# Patient Record
Sex: FEMALE | Race: WHITE | NOT HISPANIC OR LATINO | Employment: STUDENT | URBAN - METROPOLITAN AREA
[De-identification: names, ages, dates, MRNs, and addresses within clinical notes are randomized per-mention and may not be internally consistent; named-entity substitution may affect disease eponyms.]

---
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
## 2022-09-12 ENCOUNTER — OFFICE VISIT (OUTPATIENT)
Dept: FAMILY MEDICINE CLINIC | Facility: CLINIC | Age: 22
End: 2022-09-12

## 2022-09-12 VITALS
HEIGHT: 66 IN | RESPIRATION RATE: 16 BRPM | DIASTOLIC BLOOD PRESSURE: 67 MMHG | SYSTOLIC BLOOD PRESSURE: 102 MMHG | HEART RATE: 79 BPM | TEMPERATURE: 98.1 F | WEIGHT: 127.6 LBS | BODY MASS INDEX: 20.51 KG/M2

## 2022-09-12 DIAGNOSIS — Z00.00 ANNUAL PHYSICAL EXAM: Primary | ICD-10-CM

## 2022-09-12 PROCEDURE — 99395 PREV VISIT EST AGE 18-39: CPT | Performed by: FAMILY MEDICINE

## 2022-09-12 PROCEDURE — 3725F SCREEN DEPRESSION PERFORMED: CPT | Performed by: FAMILY MEDICINE

## 2022-09-12 NOTE — PATIENT INSTRUCTIONS

## 2022-09-12 NOTE — PROGRESS NOTES
[facility]    NAME: John Yañez  AGE: 25 y o  SEX: female  : 2000     DATE: 2022     Assessment and Plan:     Problem List Items Addressed This Visit    None         Immunizations and preventive care screenings were discussed with patient today  Appropriate education was printed on patient's after visit summary  Counseling:  Alcohol/drug use: discussed moderation in alcohol intake, the recommendations for healthy alcohol use, and avoidance of illicit drug use  Dental Health: discussed importance of regular tooth brushing, flossing, and dental visits  Injury prevention: discussed safety/seat belts, safety helmets, smoke detectors, carbon dioxide detectors, and smoking near bedding or upholstery  Sexual health: discussed sexually transmitted diseases, partner selection, use of condoms, avoidance of unintended pregnancy, and contraceptive alternatives  · Exercise: the importance of regular exercise/physical activity was discussed  Recommend exercise 3-5 times per week for at least 30 minutes  Depression Screening and Follow-up Plan: Patient was screened for depression during today's encounter  They screened negative with a PHQ-2 score of 0  No follow-ups on file  Chief Complaint:     Chief Complaint   Patient presents with    Physical Exam      History of Present Illness:     Adult Annual Physical   Patient here for a comprehensive physical exam  The patient reports no problems  Diet and Physical Activity  · Diet/Nutrition: well balanced diet, frequent junk food, consuming 3-5 servings of fruits/vegetables daily and moderate amount of carbs and fat  · Exercise: walking and lifting at work         Depression Screening  PHQ-2/9 Depression Screening    Little interest or pleasure in doing things: 0 - not at all  Feeling down, depressed, or hopeless: 0 - not at all  PHQ-2 Score: 0  PHQ-2 Interpretation: Negative depression screen       General Health  · Sleep: sleeps well, gets 7-8 hours of sleep on average and sometimes unrefreshing  · Hearing: normal - left  · Vision: most recent eye exam <1 year ago, wears glasses and wants contacts  eye doc concerned that L eye was larger than normal, will repeat retinal scan in 1/2023  · Dental: regular dental visits, brushes teeth twice daily and flosses teeth occasionally  /GYN Health  · Last menstrual period: 9/2/2022, regular, last 5-7 days  · Contraceptive method: no sexually active  · History of STDs?: no      Review of Systems:     Review of Systems   Constitutional: Negative for chills and fever  HENT: Negative for congestion and rhinorrhea  Eyes: Negative for visual disturbance  Respiratory: Negative for cough and shortness of breath  Cardiovascular: Negative for chest pain and palpitations  Gastrointestinal: Negative for abdominal pain, constipation, diarrhea, nausea and vomiting  Genitourinary: Negative for dysuria  Musculoskeletal: Negative for arthralgias  Skin: Negative for rash  Neurological: Negative for dizziness, light-headedness and headaches  Past Medical History:     History reviewed  No pertinent past medical history     Past Surgical History:     Past Surgical History:   Procedure Laterality Date    WISDOM TOOTH EXTRACTION        Social History:     Social History     Socioeconomic History    Marital status: Single     Spouse name: None    Number of children: None    Years of education: None    Highest education level: None   Occupational History    None   Tobacco Use    Smoking status: Never Smoker    Smokeless tobacco: Never Used   Vaping Use    Vaping Use: Never used   Substance and Sexual Activity    Alcohol use: No    Drug use: No    Sexual activity: Never   Other Topics Concern    None   Social History Narrative    None     Social Determinants of Health     Financial Resource Strain: Low Risk     Difficulty of Paying Living Expenses: Not hard at all   Food Insecurity: No Food Insecurity    Worried About Running Out of Food in the Last Year: Never true    Roverto of Food in the Last Year: Never true   Transportation Needs: No Transportation Needs    Lack of Transportation (Medical): No    Lack of Transportation (Non-Medical): No   Physical Activity: Inactive    Days of Exercise per Week: 0 days    Minutes of Exercise per Session: 0 min   Stress: No Stress Concern Present    Feeling of Stress : Not at all   Social Connections: Not on file   Intimate Partner Violence: Not At Risk    Fear of Current or Ex-Partner: No    Emotionally Abused: No    Physically Abused: No    Sexually Abused: No   Housing Stability: Low Risk     Unable to Pay for Housing in the Last Year: No    Number of Places Lived in the Last Year: 1    Unstable Housing in the Last Year: No      Family History:     Family History   Problem Relation Age of Onset    Asthma Family       Current Medications:     No current outpatient medications on file  No current facility-administered medications for this visit  Allergies: Allergies   Allergen Reactions    Penicillins       Physical Exam:     /67   Pulse 79   Temp 98 1 °F (36 7 °C) (Temporal)   Resp 16   Ht 5' 6" (1 676 m)   Wt 57 9 kg (127 lb 9 6 oz)   BMI 20 60 kg/m²     Physical Exam  Vitals reviewed  Constitutional:       Appearance: Normal appearance  HENT:      Head: Normocephalic and atraumatic  Right Ear: External ear normal       Left Ear: External ear normal       Nose: Nose normal       Mouth/Throat:      Pharynx: Oropharynx is clear  Eyes:      Extraocular Movements: Extraocular movements intact  Conjunctiva/sclera: Conjunctivae normal    Cardiovascular:      Rate and Rhythm: Normal rate and regular rhythm  Pulses: Normal pulses  Heart sounds: Normal heart sounds     Pulmonary:      Effort: Pulmonary effort is normal       Breath sounds: Normal breath sounds  Abdominal:      General: There is no distension  Palpations: Abdomen is soft  Tenderness: There is no abdominal tenderness  Musculoskeletal:      Cervical back: Neck supple  Right lower leg: No edema  Left lower leg: No edema  Skin:     General: Skin is warm  Neurological:      Mental Status: She is alert and oriented to person, place, and time  Psychiatric:         Mood and Affect: Mood normal          Behavior: Behavior normal          Thought Content:  Thought content normal          Judgment: Judgment normal           Clemente Guerra, [facility]

## 2023-10-24 ENCOUNTER — OFFICE VISIT (OUTPATIENT)
Dept: FAMILY MEDICINE CLINIC | Facility: CLINIC | Age: 23
End: 2023-10-24

## 2023-10-24 VITALS
SYSTOLIC BLOOD PRESSURE: 111 MMHG | OXYGEN SATURATION: 99 % | BODY MASS INDEX: 20.57 KG/M2 | HEART RATE: 80 BPM | HEIGHT: 66 IN | WEIGHT: 128 LBS | TEMPERATURE: 99.1 F | DIASTOLIC BLOOD PRESSURE: 73 MMHG

## 2023-10-24 DIAGNOSIS — R53.83 OTHER FATIGUE: ICD-10-CM

## 2023-10-24 DIAGNOSIS — R10.84 GENERALIZED ABDOMINAL PAIN: Primary | ICD-10-CM

## 2023-10-24 PROCEDURE — 99213 OFFICE O/P EST LOW 20 MIN: CPT | Performed by: FAMILY MEDICINE

## 2023-10-24 NOTE — ASSESSMENT & PLAN NOTE
Abdominal pain that began 1 month ago, unclear etiology. Unrelated to eating or menstrual cycle at this time. LMP 10/2, usually regular. No hx of abdominal surgery. Unremarkable abdominal exam today.     Plan:  - recommend keeping diary of when sxs occur (pain, triggers, time of day, etc) and f/u 1 month  - return/ED precautions reviewed

## 2023-10-24 NOTE — ASSESSMENT & PLAN NOTE
Reports feeling fatigue and "bone pain" for years - before COVID. No specific trigger. Nothing makes it better or worse. GAD7 score 3 and PHQ-2 score was 0.     Plan:  - check basic labs and follow up

## 2023-10-24 NOTE — PROGRESS NOTES
Name: Radha Waite      : 2000      MRN: [id number]  Encounter Provider: Zully Garcia DO  Encounter Date: 10/24/2023   Encounter department: [facility]    Assessment & Plan     1. Generalized abdominal pain  Assessment & Plan:  Abdominal pain that began 1 month ago, unclear etiology. Unrelated to eating or menstrual cycle at this time. LMP 10/2, usually regular. No hx of abdominal surgery. Unremarkable abdominal exam today. Plan:  - recommend keeping diary of when sxs occur (pain, triggers, time of day, etc) and f/u 1 month  - return/ED precautions reviewed       2. Other fatigue  Assessment & Plan:  Reports feeling fatigue and "bone pain" for years - before COVID. No specific trigger. Nothing makes it better or worse. GAD7 score 3 and PHQ-2 score was 0. Plan:  - check basic labs and follow up     Orders:  -     CBC and differential; Future; Expected date: 10/24/2023  -     Comprehensive metabolic panel; Future; Expected date: 10/24/2023  -     TSH, 3rd generation with Free T4 reflex; Future; Expected date: 10/24/2023        Depression Screening and Follow-up Plan: Patient was screened for depression during today's encounter. They screened negative with a PHQ-2 score of 0. Subjective      HPI  27 yo F presents for evaluation of abdominal pain for about a month. Unclear etiology. Sometimes associated with nausea but nausea can be alone. Feels like a soreness but other times feels like "rip or tearing." This pain can last for hours but does self-resolve. Has not noticed any association with eating or any relation to her menstrual cycle. Last BM was yesterday, occurs every other day. Denies straining. McMullen type 2/3 stool. No hx of any abdominal surgery. LMP  10/2, regular, 5 days, heavy for the first 2 days and then lightens out. Menarche age 17. Diet: breakfast bread and milk, eat at work, dinner at home.       Patient also complains of excessive fatigue and that her "bones feel sore" - usually in arms and her legs. Reports that she does not exercise but works at a [employer] which can be physically demanding. Reports that these sxs started before COVID but cannot pinpoint any triggers. Review of Systems   Constitutional:  Negative for chills and fever. HENT:  Negative for congestion and rhinorrhea. Eyes:  Negative for visual disturbance. Respiratory:  Negative for cough and shortness of breath. Cardiovascular:  Negative for chest pain and palpitations. Gastrointestinal:  Positive for abdominal pain and nausea. Negative for constipation, diarrhea and vomiting. Genitourinary:  Negative for dysuria. Musculoskeletal:  Negative for arthralgias. Skin:  Negative for rash. Neurological:  Negative for dizziness, light-headedness and headaches. No current outpatient medications on file prior to visit. Objective     /73 (BP Location: Left arm, Patient Position: Sitting, Cuff Size: Standard)   Pulse 80   Temp 99.1 °F (37.3 °C) (Temporal)   Ht 5' 6" (1.676 m)   Wt 58.1 kg (128 lb)   SpO2 99%   BMI 20.66 kg/m²     Physical Exam  Vitals reviewed. Constitutional:       General: She is not in acute distress. Appearance: Normal appearance. HENT:      Head: Normocephalic and atraumatic. Right Ear: External ear normal.      Left Ear: External ear normal.      Nose: Nose normal.      Mouth/Throat:      Pharynx: Oropharynx is clear. Eyes:      Conjunctiva/sclera: Conjunctivae normal.   Cardiovascular:      Rate and Rhythm: Normal rate and regular rhythm. Pulses: Normal pulses. Heart sounds: Normal heart sounds. Pulmonary:      Effort: Pulmonary effort is normal.      Breath sounds: Normal breath sounds. Abdominal:      General: Bowel sounds are normal. There is no distension. Palpations: Abdomen is soft. Tenderness: There is no abdominal tenderness.  There is no guarding or rebound. Musculoskeletal:      Cervical back: Neck supple. Right lower leg: No edema. Left lower leg: No edema. Skin:     General: Skin is warm. Capillary Refill: Capillary refill takes less than 2 seconds. Neurological:      Mental Status: She is alert and oriented to person, place, and time. Gait: Gait normal.   Psychiatric:         Mood and Affect: Mood normal.         Behavior: Behavior normal.         Thought Content:  Thought content normal.         Judgment: Judgment normal.       Clemente Guerra, DO

## 2023-10-25 LAB
ALBUMIN SERPL-MCNC: 4.4 G/DL (ref 3.6–5.1)
ALBUMIN/GLOB SERPL: 1.9 (CALC) (ref 1–2.5)
ALP SERPL-CCNC: 33 U/L (ref 31–125)
ALT SERPL-CCNC: 10 U/L (ref 6–29)
AST SERPL-CCNC: 13 U/L (ref 10–30)
BASOPHILS # BLD AUTO: 68 CELLS/UL (ref 0–200)
BASOPHILS NFR BLD AUTO: 1.1 %
BILIRUB SERPL-MCNC: 1.2 MG/DL (ref 0.2–1.2)
BUN SERPL-MCNC: 8 MG/DL (ref 7–25)
BUN/CREAT SERPL: NORMAL (CALC) (ref 6–22)
CALCIUM SERPL-MCNC: 9 MG/DL (ref 8.6–10.2)
CHLORIDE SERPL-SCNC: 105 MMOL/L (ref 98–110)
CO2 SERPL-SCNC: 26 MMOL/L (ref 20–32)
CREAT SERPL-MCNC: 0.86 MG/DL (ref 0.5–0.96)
EOSINOPHIL # BLD AUTO: 254 CELLS/UL (ref 15–500)
EOSINOPHIL NFR BLD AUTO: 4.1 %
ERYTHROCYTE [DISTWIDTH] IN BLOOD BY AUTOMATED COUNT: 12.4 % (ref 11–15)
GFR/BSA.PRED SERPLBLD CYS-BASED-ARV: 99 ML/MIN/1.73M2
GLOBULIN SER CALC-MCNC: 2.3 G/DL (CALC) (ref 1.9–3.7)
GLUCOSE SERPL-MCNC: 86 MG/DL (ref 65–99)
HCT VFR BLD AUTO: 40.7 % (ref 35–45)
HGB BLD-MCNC: 13.8 G/DL (ref 11.7–15.5)
LYMPHOCYTES # BLD AUTO: 1934 CELLS/UL (ref 850–3900)
LYMPHOCYTES NFR BLD AUTO: 31.2 %
MCH RBC QN AUTO: 30.9 PG (ref 27–33)
MCHC RBC AUTO-ENTMCNC: 33.9 G/DL (ref 32–36)
MCV RBC AUTO: 91.1 FL (ref 80–100)
MONOCYTES # BLD AUTO: 564 CELLS/UL (ref 200–950)
MONOCYTES NFR BLD AUTO: 9.1 %
NEUTROPHILS # BLD AUTO: 3379 CELLS/UL (ref 1500–7800)
NEUTROPHILS NFR BLD AUTO: 54.5 %
PLATELET # BLD AUTO: 277 THOUSAND/UL (ref 140–400)
PMV BLD REES-ECKER: 9.4 FL (ref 7.5–12.5)
POTASSIUM SERPL-SCNC: 4.4 MMOL/L (ref 3.5–5.3)
PROT SERPL-MCNC: 6.7 G/DL (ref 6.1–8.1)
RBC # BLD AUTO: 4.47 MILLION/UL (ref 3.8–5.1)
SODIUM SERPL-SCNC: 135 MMOL/L (ref 135–146)
TSH SERPL-ACNC: 0.97 MIU/L
WBC # BLD AUTO: 6.2 THOUSAND/UL (ref 3.8–10.8)

## 2024-02-21 PROBLEM — Z13.31 SCREENING FOR DEPRESSION: Status: RESOLVED | Noted: 2019-02-26 | Resolved: 2024-02-21

## 2024-02-21 PROBLEM — Z00.129 ENCOUNTER FOR ROUTINE CHILD HEALTH EXAMINATION WITHOUT ABNORMAL FINDINGS: Status: RESOLVED | Noted: 2019-02-26 | Resolved: 2024-02-21

## 2025-05-15 ENCOUNTER — OFFICE VISIT (OUTPATIENT)
Dept: FAMILY MEDICINE CLINIC | Facility: CLINIC | Age: 25
End: 2025-05-15

## 2025-05-15 VITALS
WEIGHT: 134 LBS | HEIGHT: 66 IN | OXYGEN SATURATION: 98 % | HEART RATE: 78 BPM | DIASTOLIC BLOOD PRESSURE: 65 MMHG | SYSTOLIC BLOOD PRESSURE: 99 MMHG | TEMPERATURE: 98.3 F | BODY MASS INDEX: 21.53 KG/M2

## 2025-05-15 DIAGNOSIS — R09.81 CONGESTION OF NASAL SINUS: Primary | ICD-10-CM

## 2025-05-15 PROCEDURE — 99213 OFFICE O/P EST LOW 20 MIN: CPT | Performed by: FAMILY MEDICINE

## 2025-05-15 NOTE — PROGRESS NOTES
Name: Aliza Hylton      : 2000      MRN: [id number]  Encounter Provider: Kim Hoyt MD  Encounter Date: 5/15/2025   Encounter department: [facility] [place]  :  Assessment & Plan  Congestion of nasal sinus  - Patient reports that 2 weeks ago she began to have a cough that had improved but now reports nasal congestion and headache (per patient has had the symptoms for approximately 7 days)   -Patient denies any fever, chills, vomiting, neck stiffness, or sinus pressure.  On exam lungs CTAB, patient noted to have minimal nasal discharge that is clear in appearance  -Recommended nasal saline spray or Flonase both patient declined.  Offered trial of allergy medicine given history of seasonal allergy however patient also declines.  - Recommend supportive care for now.  Tylenol versus NSAIDs as needed for headache.  Recommend stopping over-the-counter cold and flu medicine  -Follow-up Monday to ensure that symptoms have improved.  Consider antibiotics at that time if symptoms worsen or do not improve  -ER return precautions discussed with patient and caregiver                History of Present Illness   2 weeks ago dry cough but felt wet   Patient reprots 1 weeks ago had nasal syptoms   Works at [employer] but may have had sick contact   Patient reports that sx are better but congested still there  Hasn;t tied o      Review of Systems   Constitutional:  Negative for chills, fever and unexpected weight change.   HENT:  Positive for congestion and rhinorrhea. Negative for postnasal drip, sinus pain and sore throat.    Respiratory:  Negative for apnea, choking, shortness of breath, wheezing and stridor. Cough: Improved.   Cardiovascular: Negative.    Gastrointestinal: Negative.    Neurological:  Positive for headaches. Negative for dizziness and light-headedness.       Objective   BP 99/65 (BP Location: Left arm, Patient Position: Sitting, Cuff Size: Standard)   Pulse 78   " Temp 98.3 °F (36.8 °C) (Temporal)   Ht 5' 6\" (1.676 m)   Wt 60.8 kg (134 lb)   SpO2 98%   BMI 21.63 kg/m²      Physical Exam  Vitals and nursing note reviewed.   HENT:      Right Ear: Tympanic membrane, ear canal and external ear normal.      Left Ear: Tympanic membrane, ear canal and external ear normal.      Nose: Congestion present.      Mouth/Throat:      Pharynx: Oropharynx is clear. No oropharyngeal exudate or posterior oropharyngeal erythema.     Eyes:      General:         Right eye: No discharge.         Left eye: No discharge.      Extraocular Movements: Extraocular movements intact.      Conjunctiva/sclera: Conjunctivae normal.      Pupils: Pupils are equal, round, and reactive to light.       Cardiovascular:      Rate and Rhythm: Normal rate and regular rhythm.      Heart sounds: Normal heart sounds. No murmur heard.  Pulmonary:      Effort: Pulmonary effort is normal. No respiratory distress.      Breath sounds: No stridor. No wheezing, rhonchi or rales.   Chest:      Chest wall: No tenderness.     Skin:     General: Skin is warm.      Coloration: Skin is not jaundiced.      Findings: No bruising.     Neurological:      General: No focal deficit present.      Mental Status: She is alert.      Comments: Meningeal signs negative       "

## 2025-05-15 NOTE — ASSESSMENT & PLAN NOTE
- Patient reports that 2 weeks ago she began to have a cough that had improved but now reports nasal congestion and headache (per patient has had the symptoms for approximately 7 days)   -Patient denies any fever, chills, vomiting, neck stiffness, or sinus pressure.  On exam lungs CTAB, patient noted to have minimal nasal discharge that is clear in appearance  -Recommended nasal saline spray or Flonase both patient declined.  Offered trial of allergy medicine given history of seasonal allergy however patient also declines.  - Recommend supportive care for now.  Tylenol versus NSAIDs as needed for headache.  Recommend stopping over-the-counter cold and flu medicine  -Follow-up Monday to ensure that symptoms have improved.  Consider antibiotics at that time if symptoms worsen or do not improve  -ER return precautions discussed with patient and caregiver